# Patient Record
Sex: FEMALE | Race: WHITE | Employment: PART TIME | URBAN - METROPOLITAN AREA
[De-identification: names, ages, dates, MRNs, and addresses within clinical notes are randomized per-mention and may not be internally consistent; named-entity substitution may affect disease eponyms.]

---

## 2017-10-27 ENCOUNTER — OFFICE VISIT (OUTPATIENT)
Dept: INTERNAL MEDICINE CLINIC | Age: 72
End: 2017-10-27

## 2017-10-27 VITALS
SYSTOLIC BLOOD PRESSURE: 144 MMHG | HEART RATE: 75 BPM | OXYGEN SATURATION: 98 % | WEIGHT: 254 LBS | RESPIRATION RATE: 12 BRPM | HEIGHT: 65 IN | BODY MASS INDEX: 42.32 KG/M2 | TEMPERATURE: 98 F | DIASTOLIC BLOOD PRESSURE: 77 MMHG

## 2017-10-27 DIAGNOSIS — J45.909 REACTIVE AIRWAY DISEASE WITHOUT COMPLICATION, UNSPECIFIED ASTHMA SEVERITY, UNSPECIFIED WHETHER PERSISTENT: ICD-10-CM

## 2017-10-27 DIAGNOSIS — R73.01 IFG (IMPAIRED FASTING GLUCOSE): ICD-10-CM

## 2017-10-27 DIAGNOSIS — I10 ESSENTIAL HYPERTENSION, BENIGN: Primary | Chronic | ICD-10-CM

## 2017-10-27 DIAGNOSIS — E03.9 ACQUIRED HYPOTHYROIDISM: ICD-10-CM

## 2017-10-27 DIAGNOSIS — F43.29 ADJUSTMENT DISORDER WITH OTHER SYMPTOM: ICD-10-CM

## 2017-10-27 DIAGNOSIS — G47.00 INSOMNIA, UNSPECIFIED TYPE: ICD-10-CM

## 2017-10-27 RX ORDER — LOSARTAN POTASSIUM AND HYDROCHLOROTHIAZIDE 25; 100 MG/1; MG/1
1 TABLET ORAL DAILY
Qty: 90 TAB | Refills: 1 | Status: SHIPPED | OUTPATIENT
Start: 2017-10-27 | End: 2019-06-30 | Stop reason: SDUPTHER

## 2017-10-27 RX ORDER — LEVOTHYROXINE SODIUM 125 UG/1
TABLET ORAL
Qty: 90 TAB | Refills: 1 | Status: SHIPPED | OUTPATIENT
Start: 2017-10-27

## 2017-10-27 RX ORDER — DIETHYLPROPION HYDROCHLORIDE 25 MG/1
TABLET ORAL
COMMUNITY

## 2017-10-27 RX ORDER — ZOLPIDEM TARTRATE 10 MG/1
TABLET ORAL
COMMUNITY
End: 2017-10-27 | Stop reason: SDUPTHER

## 2017-10-27 RX ORDER — ALBUTEROL SULFATE 90 UG/1
AEROSOL, METERED RESPIRATORY (INHALATION)
Qty: 3 INHALER | Refills: 3 | Status: SHIPPED | OUTPATIENT
Start: 2017-10-27

## 2017-10-27 RX ORDER — FLUOXETINE HYDROCHLORIDE 20 MG/1
CAPSULE ORAL DAILY
COMMUNITY

## 2017-10-27 RX ORDER — ZOLPIDEM TARTRATE 10 MG/1
10 TABLET ORAL
Qty: 30 TAB | Refills: 1 | Status: SHIPPED | OUTPATIENT
Start: 2017-10-27

## 2017-10-27 RX ORDER — FLUOXETINE HYDROCHLORIDE 20 MG/1
20 CAPSULE ORAL 2 TIMES DAILY
Qty: 180 CAP | Refills: 1 | Status: SHIPPED | OUTPATIENT
Start: 2017-10-27

## 2017-10-27 RX ORDER — MULTIVIT WITH MINERALS/HERBS
1 TABLET ORAL DAILY
COMMUNITY

## 2017-10-27 NOTE — PROGRESS NOTES
Karan Perry is a 81 y.o. female who presents today for Medication Refill (Pt recently moved to [place] and needs medications refilled to last until she can establish care with new PCP)  . She has a history of   Patient Active Problem List   Diagnosis Code    Essential hypertension, benign I10    Unspecified hypothyroidism E03.9    DJD (degenerative joint disease) of knee M17.10   . Today patient is here for f/u.   she does not have other concerns. Moved to [place]. Living in [place], [place]. Needs meds until Jan.     Low thyroid: TSH trending down. Will repeat. Hypertension - better readings at home. Hypertension ROS: taking medications as instructed, no medication side effects noted, no TIA's, no chest pain on exertion, no dyspnea on exertion, no swelling of ankles     reports that she has never smoked. She has never used smokeless tobacco.    reports that she drinks about 2.5 oz of alcohol per week    BP Readings from Last 2 Encounters:   10/27/17 144/77   04/22/16 128/72     Weight loss: seeing Dr. Marianela Adame. Now on Victoza. Uses PRN ambien for travel.  checked and no fills in the past year. Asthma has been stable. ROS  Review of Systems   Constitutional: Negative for chills, fever, malaise/fatigue and weight loss. Eyes: Negative for blurred vision and double vision. Respiratory: Negative for cough and shortness of breath. Cardiovascular: Negative for chest pain, palpitations, orthopnea, claudication and leg swelling. Gastrointestinal: Negative for abdominal pain, nausea and vomiting. Genitourinary: Negative for dysuria, frequency, hematuria and urgency. Musculoskeletal: Negative for back pain, myalgias and neck pain. Skin: Negative for itching and rash. Neurological: Negative. Endo/Heme/Allergies: Does not bruise/bleed easily. Psychiatric/Behavioral: Negative for depression, hallucinations, substance abuse and suicidal ideas. The patient has insomnia.  The patient is not nervous/anxious. Visit Vitals    /77 (BP 1 Location: Left arm, BP Patient Position: Sitting)    Pulse 75    Temp 98 °F (36.7 °C) (Oral)    Resp 12    Ht 5' 5\" (1.651 m)    Wt 254 lb (115.2 kg)    SpO2 98%    BMI 42.27 kg/m2       Physical Exam   Constitutional: She is oriented to person, place, and time. She appears well-developed and well-nourished. HENT:   Head: Normocephalic and atraumatic. Cardiovascular: Normal rate and regular rhythm. No murmur heard. Pulmonary/Chest: Effort normal. No respiratory distress. Neurological: She is alert and oriented to person, place, and time. Skin: Skin is warm and dry. Psychiatric: She has a normal mood and affect. Her behavior is normal.         Current Outpatient Prescriptions   Medication Sig    FLUoxetine (PROZAC) 20 mg capsule Take  by mouth daily.  diethylpropion 25 mg tab Take  by mouth.  Liraglutide (VICTOZA) 0.6 mg/0.1 mL (18 mg/3 mL) pnij 0.6 mg by SubCUTAneous route.  MV,CA,MIN/IRON/FA/GUARANA/CAFF [facility] ACTIVE PO) Take  by mouth.  b complex vitamins tablet Take 1 Tab by mouth daily.  FISH OIL/BORAGE/FLAX/OM3,6,9 1 (OMEGA 3-6-9 COMPLEX PO) Take  by mouth.  CALCIUM CARB,GLUC/MAG OX,GLUC (CALCIUM MAGNESIUM PO) Take  by mouth.  albuterol (VENTOLIN HFA) 90 mcg/actuation inhaler INHALE 2 PUFFS BY MOUTH EVERY 4 HOURS AS NEEDED FOR WHEEZING    fluticasone-salmeterol (ADVAIR HFA) 115-21 mcg/actuation inhaler Take 2 Puffs by inhalation two (2) times a day.  levothyroxine (SYNTHROID) 125 mcg tablet TAKE 1 TABLET BY MOUTH DAILY BEFORE BREAKFAST    losartan-hydroCHLOROthiazide (HYZAAR) 100-25 mg per tablet Take 1 Tab by mouth daily.  FLUoxetine (PROZAC) 20 mg capsule Take 1 Cap by mouth two (2) times a day.  zolpidem (AMBIEN) 10 mg tablet Take 1 Tab by mouth nightly as needed for Sleep. Max Daily Amount: 10 mg.    ZINC ACETATE PO Take 5,000 mcg by mouth.     albuterol (PROAIR HFA) 90 mcg/actuation inhaler Take 2 Puffs by inhalation every four (4) hours as needed for Wheezing or Shortness of Breath.  metFORMIN ER (GLUCOPHAGE XR) 750 mg tablet Take 500 mg by mouth two (2) times a day.  cholecalciferol, vitamin D3, (VITAMIN D3) 2,000 unit tab Take 500 Units by mouth daily.  ascorbic acid (VITAMIN C) 500 mg tablet Take 1,500 mg by mouth daily. No current facility-administered medications for this visit. Past Medical History:   Diagnosis Date    Adverse effect of anesthesia     anxiety reaction after anethesia    Asthma     Essential hypertension, benign 7/22/2009    GERD (gastroesophageal reflux disease)     History of blood transfusion 1/2012    after knee replacement    Hypertension     borderlin    Prediabetes     Per Pt AIC is good; on metformin for preventative measures    Unspecified hypothyroidism 7/22/2009      Past Surgical History:   Procedure Laterality Date    ENDOSCOPY, COLON, DIAGNOSTIC      HX HEENT      sub mucous resection    HX HEENT      sinus surgery   Iram Delgado knee injections 10/8, 10/25 and 9/1    HX ORTHOPAEDIC  12/21/11    BILATERAL TOTAL KNEE ARTHROPLASTY    HX TONSILLECTOMY        Social History   Substance Use Topics    Smoking status: Never Smoker    Smokeless tobacco: Never Used    Alcohol use 2.5 oz/week     3 Glasses of wine, 2 Shots of liquor per week      No family history on file. Allergies   Allergen Reactions    Chlorhexidine Rash     Caused red rash both groins    Pcn [Penicillins] Anaphylaxis        Assessment/Plan  Diagnoses and all orders for this visit:    1. Essential hypertension, benign - refill for 6 mos. Pt is moving. Home BP better. -     losartan-hydroCHLOROthiazide (HYZAAR) 100-25 mg per tablet; Take 1 Tab by mouth daily. 2. Adjustment disorder with other symptom - refill.   -     FLUoxetine (PROZAC) 20 mg capsule; Take 1 Cap by mouth two (2) times a day.     3. IFG (impaired fasting glucose) - now on halie with Dr. Keegan Panda.   -     HEMOGLOBIN A1C WITH EAG  -     METABOLIC PANEL, BASIC    4. Acquired hypothyroidism - trending down, repeat. -     levothyroxine (SYNTHROID) 125 mcg tablet; TAKE 1 TABLET BY MOUTH DAILY BEFORE BREAKFAST  -     TSH 3RD GENERATION    5. Reactive airway disease without complication, unspecified asthma severity, unspecified whether persistent - stable  -     albuterol (VENTOLIN HFA) 90 mcg/actuation inhaler; INHALE 2 PUFFS BY MOUTH EVERY 4 HOURS AS NEEDED FOR WHEEZING  -     fluticasone-salmeterol (ADVAIR HFA) 115-21 mcg/actuation inhaler; Take 2 Puffs by inhalation two (2) times a day. 6. Insomnia, unspecified type -rare use. -     zolpidem (AMBIEN) 10 mg tablet; Take 1 Tab by mouth nightly as needed for Sleep. Max Daily Amount: 10 mg.         Follow-up Disposition: Not on File    Monica Arteaga MD  10/27/2017

## 2017-10-27 NOTE — MR AVS SNAPSHOT
Visit Information Date & Time Provider Department Dept. Phone Encounter #  
 10/27/2017  1:00 PM Austin Ayala MD [facility] [address] [id number] Upcoming Health Maintenance Date Due Hepatitis C Screening 1945 COLONOSCOPY 7/1/1965 MEDICARE YEARLY EXAM 7/1/2012 GLAUCOMA SCREENING Q2Y 9/10/2016 BREAST CANCER SCRN MAMMOGRAM 5/4/2017 INFLUENZA AGE 9 TO ADULT 8/1/2017 Pneumococcal 65+ Low/Medium Risk (2 of 2 - PPSV23) 11/19/2019 DTaP/Tdap/Td series (2 - Td) 11/20/2026 Allergies as of 10/27/2017  Review Complete On: 10/27/2017 By: Angy Maxwell LPN Severity Noted Reaction Type Reactions Chlorhexidine  12/21/2011   Intolerance Rash Caused red rash both groins Pcn [Penicillins]  02/10/2009    Anaphylaxis Current Immunizations  Reviewed on 9/19/2014 Name Date Hepatitis A Vaccine 8/23/2011, 1/8/2011 Hepatitis B Vaccine 8/23/2011, 1/8/2011 Influenza Vaccine 11/20/2016, 9/1/2014 Influenza Vaccine Split 10/23/2012 Pneumococcal Vaccine (Unspecified Type) 11/19/2014 Tdap 11/20/2016 Typhoid Vaccine 1/8/2011 Yellow Fever Vaccine 12/7/2014 Zoster Vaccine, Live 11/19/2014 Not reviewed this visit You Were Diagnosed With   
  
 Codes Comments Essential hypertension, benign    -  Primary ICD-10-CM: I10 
ICD-9-CM: 401.1 Adjustment disorder with other symptom     ICD-10-CM: F43.29 ICD-9-CM: 309.89 IFG (impaired fasting glucose)     ICD-10-CM: R73.01 
ICD-9-CM: 790.21 Acquired hypothyroidism     ICD-10-CM: E03.9 ICD-9-CM: 244.9 Reactive airway disease without complication, unspecified asthma severity, unspecified whether persistent     ICD-10-CM: J45.909 ICD-9-CM: 493.90 Insomnia, unspecified type     ICD-10-CM: G47.00 ICD-9-CM: 780.52 Vitals BP Pulse Temp Resp Height(growth percentile) Weight(growth percentile) 144/77 (BP 1 Location: Left arm, BP Patient Position: Sitting) 75 98 °F (36.7 °C) (Oral) 12 5' 5\" (1.651 m) 254 lb (115.2 kg) SpO2 BMI OB Status Smoking Status 98% 42.27 kg/m2 Postmenopausal Never Smoker Vitals History BMI and BSA Data Body Mass Index Body Surface Area  
 42.27 kg/m 2 2.3 m 2 Preferred Pharmacy Pharmacy Name Phone [address], [address] Your Updated Medication List  
  
   
This list is accurate as of: 10/27/17  1:41 PM.  Always use your most recent med list.  
  
  
  
  
 * albuterol 90 mcg/actuation inhaler Commonly known as:  PROAIR HFA Take 2 Puffs by inhalation every four (4) hours as needed for Wheezing or Shortness of Breath. * albuterol 90 mcg/actuation inhaler Commonly known as:  VENTOLIN HFA INHALE 2 PUFFS BY MOUTH EVERY 4 HOURS AS NEEDED FOR WHEEZING  
  
 b complex vitamins tablet Take 1 Tab by mouth daily. CALCIUM MAGNESIUM PO Take  by mouth. diethylpropion 25 mg Tab Take  by mouth. * FLUoxetine 20 mg capsule Commonly known as:  PROzac Take  by mouth daily. * FLUoxetine 20 mg capsule Commonly known as:  PROzac Take 1 Cap by mouth two (2) times a day. fluticasone-salmeterol 115-21 mcg/actuation inhaler Commonly known as:  ADVAIR HFA Take 2 Puffs by inhalation two (2) times a day. levothyroxine 125 mcg tablet Commonly known as:  SYNTHROID  
TAKE 1 TABLET BY MOUTH DAILY BEFORE BREAKFAST Liraglutide 0.6 mg/0.1 mL (18 mg/3 mL) Pnij Commonly known as:  VICTOZA  
0.6 mg by SubCUTAneous route. losartan-hydroCHLOROthiazide 100-25 mg per tablet Commonly known as:  HYZAAR Take 1 Tab by mouth daily. metFORMIN  mg tablet Commonly known as:  GLUCOPHAGE XR Take 500 mg by mouth two (2) times a day. OMEGA 3-6-9 COMPLEX PO Take  by mouth. VITAMIN C 500 mg tablet Generic drug:  ascorbic acid (vitamin C) Take 1,500 mg by mouth daily. VITAMIN D3 2,000 unit Tab Generic drug:  cholecalciferol (vitamin D3) Take 500 Units by mouth daily. [address] Take  by mouth. ZINC ACETATE PO Take 5,000 mcg by mouth.  
  
 zolpidem 10 mg tablet Commonly known as:  AMBIEN Take 1 Tab by mouth nightly as needed for Sleep. Max Daily Amount: 10 mg.  
  
 * Notice: This list has 4 medication(s) that are the same as other medications prescribed for you. Read the directions carefully, and ask your doctor or other care provider to review them with you. Prescriptions Printed Refills  
 zolpidem (AMBIEN) 10 mg tablet 1 Sig: Take 1 Tab by mouth nightly as needed for Sleep. Max Daily Amount: 10 mg.  
 Class: Print Route: Oral  
  
Prescriptions Sent to Pharmacy Refills  
 albuterol (VENTOLIN HFA) 90 mcg/actuation inhaler 3 Sig: INHALE 2 PUFFS BY MOUTH EVERY 4 HOURS AS NEEDED FOR WHEEZING Class: Normal  
 Pharmacy: [address] Ph #: [phone]  
 fluticasone-salmeterol (ADVAIR HFA) 115-21 mcg/actuation inhaler 3 Sig: Take 2 Puffs by inhalation two (2) times a day. Class: Normal  
 Pharmacy: [address] [place] [address] Ph #: [phone] Route: Inhalation  
 levothyroxine (SYNTHROID) 125 mcg tablet 1 Sig: TAKE 1 TABLET BY MOUTH DAILY BEFORE BREAKFAST Class: Normal  
 Pharmacy: [facility] [address] [place] [address] Ph #: [phone]  
 losartan-hydroCHLOROthiazide (HYZAAR) 100-25 mg per tablet 1 Sig: Take 1 Tab by mouth daily. Class: Normal  
 Pharmacy: [address] [place] [address] Ph #: [phone]  Route: Oral  
 FLUoxetine (PROZAC) 20 mg capsule 1 Sig: Take 1 Cap by mouth two (2) times a day. Class: Normal  
 Pharmacy: [address] [facility], [facility] [address]  #: [phone] Route: Oral  
  
We Performed the Following HEMOGLOBIN A1C WITH EAG [04518 CPT(R)] METABOLIC PANEL, BASIC [11398 CPT(R)] TSH 3RD GENERATION [93468 CPT(R)] Introducing [facility]! Dear Rafa Morris: Thank you for requesting a [employer] account. Our records indicate that you already have an active [employer] account. You can access your account anytime at [email or web address] Did you know that you can access your hospital and ER discharge instructions at any time in [employer]? You can also review all of your test results from your hospital stay or ER visit. Additional Information If you have questions, please visit the Frequently Asked Questions section of the [employer] website at [email or web address] Remember, [employer] is NOT to be used for urgent needs. For medical emergencies, dial 911. Now available from your iPhone and Android! Please provide this summary of care documentation to your next provider. Your primary care clinician is listed as Nba Hernandez. If you have any questions after today's visit, please call [phone].

## 2017-10-27 NOTE — PROGRESS NOTES
Pt requesting refills on Ventolin, Advair, Levothyroxin, and Rx for Losartan-HCTZ 100-25mg, Fluoxetine 20 mg and Ambien 10 mg. Rx to be sent to local pharmacy on file.

## 2017-10-28 LAB
BUN SERPL-MCNC: 20 MG/DL (ref 8–27)
BUN/CREAT SERPL: 25 (ref 12–28)
CALCIUM SERPL-MCNC: 9.5 MG/DL (ref 8.7–10.3)
CHLORIDE SERPL-SCNC: 98 MMOL/L (ref 96–106)
CO2 SERPL-SCNC: 29 MMOL/L (ref 18–29)
CREAT SERPL-MCNC: 0.8 MG/DL (ref 0.57–1)
EST. AVERAGE GLUCOSE BLD GHB EST-MCNC: 114 MG/DL
GFR SERPLBLD CREATININE-BSD FMLA CKD-EPI: 75 ML/MIN/1.73
GFR SERPLBLD CREATININE-BSD FMLA CKD-EPI: 86 ML/MIN/1.73
GLUCOSE SERPL-MCNC: 84 MG/DL (ref 65–99)
HBA1C MFR BLD: 5.6 % (ref 4.8–5.6)
POTASSIUM SERPL-SCNC: 4.2 MMOL/L (ref 3.5–5.2)
SODIUM SERPL-SCNC: 141 MMOL/L (ref 134–144)
TSH SERPL DL<=0.005 MIU/L-ACNC: 0.36 UIU/ML (ref 0.45–4.5)

## 2017-11-29 ENCOUNTER — PATIENT MESSAGE (OUTPATIENT)
Dept: INTERNAL MEDICINE CLINIC | Age: 72
End: 2017-11-29

## 2017-11-29 NOTE — TELEPHONE ENCOUNTER
From: Lance Workman  To: Willie Peralta MD  Sent: 11/29/2017 8:28 AM EST  Subject: Update Medical Information    Please update my record to reflect that I received FLUAD 2017-18 0.5ML on November 28, 2017 at [address] [place] [address] 115.  Thx!

## 2018-11-02 DIAGNOSIS — J45.909 REACTIVE AIRWAY DISEASE WITHOUT COMPLICATION, UNSPECIFIED ASTHMA SEVERITY, UNSPECIFIED WHETHER PERSISTENT: ICD-10-CM

## 2018-11-02 RX ORDER — FLUTICASONE PROPIONATE AND SALMETEROL XINAFOATE 115; 21 UG/1; UG/1
AEROSOL, METERED RESPIRATORY (INHALATION)
Qty: 36 G | Refills: 0 | Status: SHIPPED | OUTPATIENT
Start: 2018-11-02

## 2019-06-30 DIAGNOSIS — I10 ESSENTIAL HYPERTENSION, BENIGN: Chronic | ICD-10-CM

## 2019-07-01 RX ORDER — LOSARTAN POTASSIUM AND HYDROCHLOROTHIAZIDE 25; 100 MG/1; MG/1
TABLET ORAL
Qty: 30 TAB | Refills: 0 | Status: SHIPPED | OUTPATIENT
Start: 2019-07-01

## 2019-07-02 NOTE — TELEPHONE ENCOUNTER
Reached out to patient to assist w/ scheduling a medication evaluation / follow up appointment w/ Dr Sade Alvarez  No answer left V/M - thank you

## 2022-02-23 ENCOUNTER — APPOINTMENT (OUTPATIENT)
Dept: URBAN - METROPOLITAN AREA CLINIC 212 | Age: 77
Setting detail: DERMATOLOGY
End: 2022-02-24

## 2022-02-23 DIAGNOSIS — D18.0 HEMANGIOMA: ICD-10-CM

## 2022-02-23 DIAGNOSIS — L82.1 OTHER SEBORRHEIC KERATOSIS: ICD-10-CM

## 2022-02-23 DIAGNOSIS — L81.4 OTHER MELANIN HYPERPIGMENTATION: ICD-10-CM

## 2022-02-23 DIAGNOSIS — Z71.89 OTHER SPECIFIED COUNSELING: ICD-10-CM

## 2022-02-23 DIAGNOSIS — D22 MELANOCYTIC NEVI: ICD-10-CM

## 2022-02-23 DIAGNOSIS — L82.0 INFLAMED SEBORRHEIC KERATOSIS: ICD-10-CM

## 2022-02-23 DIAGNOSIS — Z80.8 FAMILY HISTORY OF MALIGNANT NEOPLASM OF OTHER ORGANS OR SYSTEMS: ICD-10-CM

## 2022-02-23 PROBLEM — D18.01 HEMANGIOMA OF SKIN AND SUBCUTANEOUS TISSUE: Status: ACTIVE | Noted: 2022-02-23

## 2022-02-23 PROBLEM — D22.5 MELANOCYTIC NEVI OF TRUNK: Status: ACTIVE | Noted: 2022-02-23

## 2022-02-23 PROCEDURE — 99203 OFFICE O/P NEW LOW 30 MIN: CPT | Mod: 25

## 2022-02-23 PROCEDURE — OTHER REASSURANCE: OTHER

## 2022-02-23 PROCEDURE — OTHER LIQUID NITROGEN: OTHER

## 2022-02-23 PROCEDURE — 17110 DESTRUCT B9 LESION 1-14: CPT

## 2022-02-23 PROCEDURE — OTHER MIPS QUALITY: OTHER

## 2022-02-23 PROCEDURE — OTHER COUNSELING: OTHER

## 2022-02-23 PROCEDURE — OTHER SUNSCREEN RECOMMENDATIONS: OTHER

## 2022-02-23 ASSESSMENT — LOCATION ZONE DERM
LOCATION ZONE: ARM
LOCATION ZONE: TRUNK

## 2022-02-23 ASSESSMENT — LOCATION DETAILED DESCRIPTION DERM
LOCATION DETAILED: SUPERIOR THORACIC SPINE
LOCATION DETAILED: EPIGASTRIC SKIN
LOCATION DETAILED: RIGHT INFERIOR MEDIAL UPPER BACK
LOCATION DETAILED: RIGHT PROXIMAL DORSAL FOREARM
LOCATION DETAILED: RIGHT SUPERIOR UPPER BACK

## 2022-02-23 ASSESSMENT — LOCATION SIMPLE DESCRIPTION DERM
LOCATION SIMPLE: UPPER BACK
LOCATION SIMPLE: ABDOMEN
LOCATION SIMPLE: RIGHT UPPER BACK
LOCATION SIMPLE: RIGHT FOREARM

## 2022-02-23 NOTE — PROCEDURE: LIQUID NITROGEN
Show Topical Anesthesia Variable?: Yes
Spray Paint Text: The liquid nitrogen was applied to the skin utilizing a spray paint frosting technique.
Include Z78.9 (Other Specified Conditions Influencing Health Status) As An Associated Diagnosis?: No
Medical Necessity Clause: This procedure was medically necessary because the lesions that were treated were:
Post-Care Instructions: I reviewed with the patient in detail post-care instructions. Patient is to wear sunprotection, and avoid picking at any of the treated lesions. Pt may apply Vaseline to crusted or scabbing areas.\\n\\nAware of potential for PIH/ time frame for resolution reviewed.
Consent: The patient's consent was obtained including but not limited to risks of crusting, scabbing, blistering, scarring, darker or lighter pigmentary change, recurrence, incomplete removal and infection.
Medical Necessity Information: It is in your best interest to select a reason for this procedure from the list below. All of these items fulfill various CMS LCD requirements except the new and changing color options.
Detail Level: Detailed

## 2022-02-23 NOTE — PROCEDURE: MIPS QUALITY
Quality 111:Pneumonia Vaccination Status For Older Adults: Pneumococcal Vaccination Previously Received
Quality 226: Preventive Care And Screening: Tobacco Use: Screening And Cessation Intervention: Patient screened for tobacco use and is an ex/non-smoker
Quality 130: Documentation Of Current Medications In The Medical Record: Current Medications Documented
Quality 110: Preventive Care And Screening: Influenza Immunization: Influenza Immunization previously received during influenza season
Quality 400a: One-Time Screening For Hepatitis C Virus (Hcv) For All Patients: Patient received one-time screening for HCV infection
Detail Level: Detailed
Quality 431: Preventive Care And Screening: Unhealthy Alcohol Use - Screening: Patient not identified as an unhealthy alcohol user when screened for unhealthy alcohol use using a systematic screening method
Quality 431: Preventive Care And Screening: Unhealthy Alcohol Use - Screening: Patient screened for unhealthy alcohol use using a single question and scores less than 2 times per year
